# Patient Record
Sex: MALE | Race: WHITE | NOT HISPANIC OR LATINO | URBAN - METROPOLITAN AREA
[De-identification: names, ages, dates, MRNs, and addresses within clinical notes are randomized per-mention and may not be internally consistent; named-entity substitution may affect disease eponyms.]

---

## 2019-01-30 ENCOUNTER — OFFICE (OUTPATIENT)
Dept: URBAN - METROPOLITAN AREA CLINIC 11 | Facility: CLINIC | Age: 59
End: 2019-01-30

## 2019-01-30 VITALS
HEIGHT: 74 IN | HEART RATE: 70 BPM | WEIGHT: 246 LBS | DIASTOLIC BLOOD PRESSURE: 92 MMHG | SYSTOLIC BLOOD PRESSURE: 146 MMHG

## 2019-01-30 DIAGNOSIS — K60.2 ANAL FISSURE, UNSPECIFIED: ICD-10-CM

## 2019-01-30 DIAGNOSIS — K64.9 UNSPECIFIED HEMORRHOIDS: ICD-10-CM

## 2019-01-30 DIAGNOSIS — Z86.010 PERSONAL HISTORY OF COLONIC POLYPS: ICD-10-CM

## 2019-01-30 DIAGNOSIS — K92.1 MELENA: ICD-10-CM

## 2019-01-30 PROCEDURE — 99203 OFFICE O/P NEW LOW 30 MIN: CPT | Performed by: INTERNAL MEDICINE

## 2019-01-30 RX ORDER — POLYETHYLENE GLYCOL 3350, SODIUM SULFATE ANHYDROUS, SODIUM BICARBONATE, SODIUM CHLORIDE, POTASSIUM CHLORIDE 236; 22.74; 6.74; 5.86; 2.97 G/4L; G/4L; G/4L; G/4L; G/4L
POWDER, FOR SOLUTION ORAL
Qty: 4000 | Refills: 0 | Status: COMPLETED
Start: 2019-01-30 | End: 2019-03-04

## 2019-01-30 RX ORDER — NITROGLYCERIN 4 MG/G
0.8 OINTMENT RECTAL
Qty: 1 | Refills: 2 | Status: ACTIVE
Start: 2019-01-30

## 2019-03-04 ENCOUNTER — AMBULATORY SURGICAL CENTER (OUTPATIENT)
Dept: URBAN - METROPOLITAN AREA SURGERY 3 | Facility: SURGERY | Age: 59
End: 2019-03-04

## 2019-03-04 ENCOUNTER — OFFICE (OUTPATIENT)
Dept: URBAN - METROPOLITAN AREA PATHOLOGY 22 | Facility: PATHOLOGY | Age: 59
End: 2019-03-04

## 2019-03-04 VITALS
DIASTOLIC BLOOD PRESSURE: 75 MMHG | HEART RATE: 58 BPM | HEIGHT: 74 IN | HEART RATE: 63 BPM | DIASTOLIC BLOOD PRESSURE: 47 MMHG | DIASTOLIC BLOOD PRESSURE: 75 MMHG | SYSTOLIC BLOOD PRESSURE: 94 MMHG | HEART RATE: 62 BPM | SYSTOLIC BLOOD PRESSURE: 94 MMHG | HEART RATE: 63 BPM | SYSTOLIC BLOOD PRESSURE: 137 MMHG | HEART RATE: 58 BPM | DIASTOLIC BLOOD PRESSURE: 53 MMHG | TEMPERATURE: 97.8 F | RESPIRATION RATE: 16 BRPM | OXYGEN SATURATION: 95 % | DIASTOLIC BLOOD PRESSURE: 47 MMHG | WEIGHT: 230 LBS | DIASTOLIC BLOOD PRESSURE: 53 MMHG | DIASTOLIC BLOOD PRESSURE: 58 MMHG | HEIGHT: 74 IN | OXYGEN SATURATION: 98 % | SYSTOLIC BLOOD PRESSURE: 142 MMHG | RESPIRATION RATE: 16 BRPM | SYSTOLIC BLOOD PRESSURE: 142 MMHG | RESPIRATION RATE: 20 BRPM | SYSTOLIC BLOOD PRESSURE: 115 MMHG | SYSTOLIC BLOOD PRESSURE: 137 MMHG | SYSTOLIC BLOOD PRESSURE: 115 MMHG | HEART RATE: 62 BPM | HEART RATE: 64 BPM | DIASTOLIC BLOOD PRESSURE: 58 MMHG | OXYGEN SATURATION: 96 % | TEMPERATURE: 97.8 F | OXYGEN SATURATION: 95 % | SYSTOLIC BLOOD PRESSURE: 130 MMHG | WEIGHT: 230 LBS | HEART RATE: 64 BPM | RESPIRATION RATE: 20 BRPM | RESPIRATION RATE: 18 BRPM | SYSTOLIC BLOOD PRESSURE: 130 MMHG | OXYGEN SATURATION: 98 % | OXYGEN SATURATION: 96 % | RESPIRATION RATE: 18 BRPM

## 2019-03-04 DIAGNOSIS — K64.4 RESIDUAL HEMORRHOIDAL SKIN TAGS: ICD-10-CM

## 2019-03-04 DIAGNOSIS — K64.0 FIRST DEGREE HEMORRHOIDS: ICD-10-CM

## 2019-03-04 DIAGNOSIS — K57.30 DIVERTICULOSIS OF LARGE INTESTINE WITHOUT PERFORATION OR ABS: ICD-10-CM

## 2019-03-04 DIAGNOSIS — K63.5 POLYP OF COLON: ICD-10-CM

## 2019-03-04 DIAGNOSIS — K62.1 RECTAL POLYP: ICD-10-CM

## 2019-03-04 DIAGNOSIS — D12.3 BENIGN NEOPLASM OF TRANSVERSE COLON: ICD-10-CM

## 2019-03-04 DIAGNOSIS — Z86.010 PERSONAL HISTORY OF COLONIC POLYPS: ICD-10-CM

## 2019-03-04 PROBLEM — D12.5 BENIGN NEOPLASM OF SIGMOID COLON: Status: ACTIVE | Noted: 2019-03-04

## 2019-03-04 PROCEDURE — 45380 COLONOSCOPY AND BIOPSY: CPT | Mod: 33 | Performed by: INTERNAL MEDICINE

## 2019-03-04 PROCEDURE — 88305 TISSUE EXAM BY PATHOLOGIST: CPT | Performed by: INTERNAL MEDICINE

## 2019-05-01 ENCOUNTER — OFFICE (OUTPATIENT)
Dept: URBAN - METROPOLITAN AREA CLINIC 11 | Facility: CLINIC | Age: 59
End: 2019-05-01

## 2019-05-01 VITALS
HEIGHT: 74 IN | DIASTOLIC BLOOD PRESSURE: 80 MMHG | SYSTOLIC BLOOD PRESSURE: 140 MMHG | WEIGHT: 245 LBS | HEART RATE: 76 BPM

## 2019-05-01 DIAGNOSIS — K60.2 ANAL FISSURE, UNSPECIFIED: ICD-10-CM

## 2019-05-01 DIAGNOSIS — K64.0 FIRST DEGREE HEMORRHOIDS: ICD-10-CM

## 2019-05-01 DIAGNOSIS — Z86.010 PERSONAL HISTORY OF COLONIC POLYPS: ICD-10-CM

## 2019-05-01 PROCEDURE — 99213 OFFICE O/P EST LOW 20 MIN: CPT | Performed by: INTERNAL MEDICINE

## 2019-11-06 ENCOUNTER — OFFICE (OUTPATIENT)
Dept: URBAN - METROPOLITAN AREA CLINIC 11 | Facility: CLINIC | Age: 59
End: 2019-11-06

## 2019-11-06 VITALS
DIASTOLIC BLOOD PRESSURE: 91 MMHG | HEIGHT: 74 IN | WEIGHT: 248 LBS | SYSTOLIC BLOOD PRESSURE: 151 MMHG | HEART RATE: 79 BPM

## 2019-11-06 DIAGNOSIS — K60.2 ANAL FISSURE, UNSPECIFIED: ICD-10-CM

## 2019-11-06 DIAGNOSIS — Z86.010 PERSONAL HISTORY OF COLONIC POLYPS: ICD-10-CM

## 2019-11-06 PROCEDURE — 99213 OFFICE O/P EST LOW 20 MIN: CPT | Performed by: INTERNAL MEDICINE

## 2024-01-29 ENCOUNTER — OFFICE (OUTPATIENT)
Dept: URBAN - METROPOLITAN AREA CLINIC 11 | Facility: CLINIC | Age: 64
End: 2024-01-29

## 2024-01-29 VITALS
HEART RATE: 69 BPM | OXYGEN SATURATION: 98 % | SYSTOLIC BLOOD PRESSURE: 169 MMHG | HEIGHT: 74 IN | DIASTOLIC BLOOD PRESSURE: 94 MMHG | WEIGHT: 218 LBS

## 2024-01-29 DIAGNOSIS — K21.9 GASTRO-ESOPHAGEAL REFLUX DISEASE WITHOUT ESOPHAGITIS: ICD-10-CM

## 2024-01-29 DIAGNOSIS — R10.13 EPIGASTRIC PAIN: ICD-10-CM

## 2024-01-29 DIAGNOSIS — K59.00 CONSTIPATION, UNSPECIFIED: ICD-10-CM

## 2024-01-29 DIAGNOSIS — K92.1 MELENA: ICD-10-CM

## 2024-01-29 DIAGNOSIS — Z86.010 PERSONAL HISTORY OF COLONIC POLYPS: ICD-10-CM

## 2024-01-29 DIAGNOSIS — K64.9 UNSPECIFIED HEMORRHOIDS: ICD-10-CM

## 2024-01-29 PROCEDURE — 99204 OFFICE O/P NEW MOD 45 MIN: CPT | Performed by: NURSE PRACTITIONER

## 2024-01-29 NOTE — SERVICENOTES
he is due  for colonoscopy after March 4th.  We will get him scheduled and will get an EGD to further evaluate occasional epigastric pain given his history of GERD.  he will continue his Nexium daily for GERD and will use Gaviscon as needed for epigastric pain.   His hematochezia could likely be secondary to constipation and hemorrhoids.   He is constipation started a year ago when he started using a fiber supplement and admits to not drinking enough water.   Discussed that this could be contributing to his constipation.  He will increase his water intake and use MiraLax as needed.  will see back in 3 months or sooner if needed

## 2024-01-29 NOTE — SERVICEHPINOTES
Mr. Powell is a 63 year old male here today with complaints of blood in stool. He has a history of hemorrhoids and an anal fissure.   he has had blood in his stool, in the toilet and when he wipes sporadically for the last few weeks.  He did have blood with every bowel movement for a solid week.   He started taking a fiber supplement a year ago and has had some occasional constipation during this time.  He admits to not drinking enough water.   He has bowel movements daily but occasionally will go up to 3 days with no bowel movement.   He did have an episode of constipation last week.   He is on Nexium 40 mg daily for GERD but does have some occasional epigastric pain with certain things he eats.   He denies any nausea, vomiting.  br
br
br A detailed history from previous visits with Dr. Stallworth can be seen below. He initially saw me in January of this year when he presented with anal pain for bleeding. His symptoms were consistent with that of an anal fissure. However, he also was due for colorectal cancer screening as he has a history of adenomatous polyps and given the bleeding and his history, we performed a colonoscopy. This was performed in March of this year and on this exam we found 5 polyps. Most of these were hyperplastic polyps in the rectum however 1 in the more proximal colon was adenomatous. He was set for a 5 year recall. I also started him on topical nitroglycerin for his anal fissure.  Since that time he has been doing well.  He had 1 recurrence of some bleeding but this was only 1 instance has otherwise been doing well.

## 2024-03-07 ENCOUNTER — AMBULATORY SURGICAL CENTER (OUTPATIENT)
Dept: URBAN - METROPOLITAN AREA SURGERY 3 | Facility: SURGERY | Age: 64
End: 2024-03-07
Payer: COMMERCIAL

## 2024-03-07 ENCOUNTER — OFFICE (OUTPATIENT)
Dept: URBAN - METROPOLITAN AREA PATHOLOGY 12 | Facility: PATHOLOGY | Age: 64
End: 2024-03-07

## 2024-03-07 ENCOUNTER — AMBULATORY SURGICAL CENTER (OUTPATIENT)
Dept: URBAN - METROPOLITAN AREA SURGERY 3 | Facility: SURGERY | Age: 64
End: 2024-03-07

## 2024-03-07 VITALS
SYSTOLIC BLOOD PRESSURE: 136 MMHG | DIASTOLIC BLOOD PRESSURE: 69 MMHG | OXYGEN SATURATION: 94 % | RESPIRATION RATE: 12 BRPM | DIASTOLIC BLOOD PRESSURE: 69 MMHG | HEART RATE: 58 BPM | WEIGHT: 215 LBS | OXYGEN SATURATION: 93 % | SYSTOLIC BLOOD PRESSURE: 131 MMHG | OXYGEN SATURATION: 93 % | DIASTOLIC BLOOD PRESSURE: 68 MMHG | SYSTOLIC BLOOD PRESSURE: 127 MMHG | HEART RATE: 60 BPM | HEART RATE: 65 BPM | HEIGHT: 74 IN | DIASTOLIC BLOOD PRESSURE: 69 MMHG | SYSTOLIC BLOOD PRESSURE: 154 MMHG | DIASTOLIC BLOOD PRESSURE: 82 MMHG | HEART RATE: 65 BPM | SYSTOLIC BLOOD PRESSURE: 127 MMHG | SYSTOLIC BLOOD PRESSURE: 136 MMHG | DIASTOLIC BLOOD PRESSURE: 82 MMHG | DIASTOLIC BLOOD PRESSURE: 82 MMHG | SYSTOLIC BLOOD PRESSURE: 136 MMHG | DIASTOLIC BLOOD PRESSURE: 68 MMHG | RESPIRATION RATE: 12 BRPM | SYSTOLIC BLOOD PRESSURE: 131 MMHG | HEART RATE: 65 BPM | TEMPERATURE: 98.5 F | SYSTOLIC BLOOD PRESSURE: 131 MMHG | RESPIRATION RATE: 17 BRPM | RESPIRATION RATE: 19 BRPM | HEART RATE: 60 BPM | SYSTOLIC BLOOD PRESSURE: 123 MMHG | WEIGHT: 215 LBS | TEMPERATURE: 97.9 F | WEIGHT: 215 LBS | SYSTOLIC BLOOD PRESSURE: 154 MMHG | TEMPERATURE: 97.9 F | RESPIRATION RATE: 23 BRPM | TEMPERATURE: 98.5 F | HEART RATE: 58 BPM | RESPIRATION RATE: 19 BRPM | RESPIRATION RATE: 23 BRPM | DIASTOLIC BLOOD PRESSURE: 81 MMHG | OXYGEN SATURATION: 96 % | HEIGHT: 74 IN | HEART RATE: 56 BPM | HEART RATE: 58 BPM | OXYGEN SATURATION: 92 % | SYSTOLIC BLOOD PRESSURE: 127 MMHG | OXYGEN SATURATION: 93 % | RESPIRATION RATE: 16 BRPM | HEART RATE: 60 BPM | RESPIRATION RATE: 17 BRPM | SYSTOLIC BLOOD PRESSURE: 123 MMHG | RESPIRATION RATE: 23 BRPM | SYSTOLIC BLOOD PRESSURE: 154 MMHG | DIASTOLIC BLOOD PRESSURE: 81 MMHG | RESPIRATION RATE: 17 BRPM | RESPIRATION RATE: 16 BRPM | RESPIRATION RATE: 16 BRPM | OXYGEN SATURATION: 94 % | OXYGEN SATURATION: 96 % | OXYGEN SATURATION: 96 % | OXYGEN SATURATION: 94 % | TEMPERATURE: 98.5 F | SYSTOLIC BLOOD PRESSURE: 123 MMHG | RESPIRATION RATE: 19 BRPM | HEART RATE: 56 BPM | DIASTOLIC BLOOD PRESSURE: 68 MMHG | DIASTOLIC BLOOD PRESSURE: 81 MMHG | HEIGHT: 74 IN | OXYGEN SATURATION: 92 % | TEMPERATURE: 97.9 F | HEART RATE: 56 BPM | OXYGEN SATURATION: 92 % | RESPIRATION RATE: 12 BRPM

## 2024-03-07 DIAGNOSIS — K57.30 DIVERTICULOSIS OF LARGE INTESTINE WITHOUT PERFORATION OR ABS: ICD-10-CM

## 2024-03-07 DIAGNOSIS — Z86.010 PERSONAL HISTORY OF COLONIC POLYPS: ICD-10-CM

## 2024-03-07 DIAGNOSIS — K22.70 BARRETT'S ESOPHAGUS WITHOUT DYSPLASIA: ICD-10-CM

## 2024-03-07 DIAGNOSIS — K31.89 OTHER DISEASES OF STOMACH AND DUODENUM: ICD-10-CM

## 2024-03-07 DIAGNOSIS — K64.0 FIRST DEGREE HEMORRHOIDS: ICD-10-CM

## 2024-03-07 DIAGNOSIS — K21.9 GASTRO-ESOPHAGEAL REFLUX DISEASE WITHOUT ESOPHAGITIS: ICD-10-CM

## 2024-03-07 DIAGNOSIS — R10.13 EPIGASTRIC PAIN: ICD-10-CM

## 2024-03-07 DIAGNOSIS — K63.5 POLYP OF COLON: ICD-10-CM

## 2024-03-07 DIAGNOSIS — K92.1 MELENA: ICD-10-CM

## 2024-03-07 DIAGNOSIS — Z09 ENCOUNTER FOR FOLLOW-UP EXAMINATION AFTER COMPLETED TREATMEN: ICD-10-CM

## 2024-03-07 PROCEDURE — 88342 IMHCHEM/IMCYTCHM 1ST ANTB: CPT | Performed by: STUDENT IN AN ORGANIZED HEALTH CARE EDUCATION/TRAINING PROGRAM

## 2024-03-07 PROCEDURE — 45380 COLONOSCOPY AND BIOPSY: CPT | Performed by: INTERNAL MEDICINE

## 2024-03-07 PROCEDURE — 88313 SPECIAL STAINS GROUP 2: CPT | Performed by: STUDENT IN AN ORGANIZED HEALTH CARE EDUCATION/TRAINING PROGRAM

## 2024-03-07 PROCEDURE — 43239 EGD BIOPSY SINGLE/MULTIPLE: CPT | Mod: 51 | Performed by: INTERNAL MEDICINE

## 2024-03-07 PROCEDURE — 88305 TISSUE EXAM BY PATHOLOGIST: CPT | Performed by: STUDENT IN AN ORGANIZED HEALTH CARE EDUCATION/TRAINING PROGRAM

## 2024-03-11 LAB
GASTRO ONE PATHOLOGY: PDF REPORT: (no result)

## 2024-04-30 ENCOUNTER — OFFICE (OUTPATIENT)
Dept: URBAN - METROPOLITAN AREA CLINIC 11 | Facility: CLINIC | Age: 64
End: 2024-04-30

## 2024-04-30 VITALS
HEIGHT: 74 IN | TEMPERATURE: 93 F | WEIGHT: 221 LBS | HEART RATE: 76 BPM | DIASTOLIC BLOOD PRESSURE: 96 MMHG | DIASTOLIC BLOOD PRESSURE: 87 MMHG | SYSTOLIC BLOOD PRESSURE: 156 MMHG | SYSTOLIC BLOOD PRESSURE: 161 MMHG

## 2024-04-30 DIAGNOSIS — K22.70 BARRETT'S ESOPHAGUS WITHOUT DYSPLASIA: ICD-10-CM

## 2024-04-30 DIAGNOSIS — K92.1 MELENA: ICD-10-CM

## 2024-04-30 DIAGNOSIS — K59.00 CONSTIPATION, UNSPECIFIED: ICD-10-CM

## 2024-04-30 PROCEDURE — 99213 OFFICE O/P EST LOW 20 MIN: CPT | Performed by: NURSE PRACTITIONER

## 2024-04-30 NOTE — SERVICENOTES
he will continue Nexium for GERD and we will repeat EGD in 2 years for his Prajapati's. he will use Miralax as needed for his occasional  constipation.  Will see back in 1 year or sooner if needed

## 2024-04-30 NOTE — SERVICEHPINOTES
Mr. Powell is a 64 year old male here today for follow up  of blood in stool. He has a history of hemorrhoids and an anal fissure. He initially presented with anal pain and bleeding consistent with an anal fissure. He has a history of adenomatous polyps and given the bleeding and his history, jael had a colonoscopy in 3/2019 and found 5 polyps. Most of these were hyperplastic polyps in the rectum however 1 in the more proximal colon was adenomatous. He was set for a 5 year recall. He was started on topical nitroglycerin for his anal fissure. 
nimco
nimco In follow up on 11/6/19, he has been doing well.  He had 1 recurrence of some bleeding but this was only 1 instance has otherwise been doing well. 
nimco
nimco On 1/29/24, he has had blood in his stool, in the toilet and when he wipes sporadically for the last few weeks.  He did have blood with every bowel movement for a solid week.   He started taking a fiber supplement a year ago and has had some occasional constipation during this time.  He admits to not drinking enough water.   He has bowel movements daily but occasionally will go up to 3 days with no bowel movement.   He did have an episode of constipation last week.   He is on Nexium 40 mg daily for GERD but does have some occasional epigastric pain with certain things he eats.  he had an  EGD and colonoscopy on 03/07/2024.  EGD showed gastritis and mucosa suggestive of Prajapati's esophagus.   Pathology was negative for H pylori but confirmed Prajapati's esophagus.   He had moderate diverticulosis, grade 1 internal / external hemorrhoids and benign polyps on colonoscopy.   
nimco
nimco In follow-up on 04/30/2024,  he is doing well and has no complaints.  His GERD is doing well on Nexium and he has not had any episodes of epigastric pain since his last visit.   He still has occasional bright red blood when he wipes but denies any blood in the toilet or in his stool.  He has been having regular bowel movements and denies any constipation.

## 2025-04-30 ENCOUNTER — OFFICE (OUTPATIENT)
Dept: URBAN - METROPOLITAN AREA CLINIC 11 | Facility: CLINIC | Age: 65
End: 2025-04-30
Payer: COMMERCIAL

## 2025-04-30 VITALS
WEIGHT: 217 LBS | DIASTOLIC BLOOD PRESSURE: 85 MMHG | OXYGEN SATURATION: 99 % | SYSTOLIC BLOOD PRESSURE: 139 MMHG | HEART RATE: 78 BPM | HEIGHT: 74 IN

## 2025-04-30 DIAGNOSIS — K22.70 BARRETT'S ESOPHAGUS WITHOUT DYSPLASIA: ICD-10-CM

## 2025-04-30 DIAGNOSIS — K64.9 UNSPECIFIED HEMORRHOIDS: ICD-10-CM

## 2025-04-30 PROCEDURE — 99213 OFFICE O/P EST LOW 20 MIN: CPT | Performed by: NURSE PRACTITIONER

## 2025-04-30 NOTE — SERVICENOTES
he is doing well today and has no major complaints.  His GERD continues to be well-controlled on OTC esomeprazole.  He will continue his grade 1 hemorrhoids get flared up if he goes 2-3 days with no bowel movement.  Discussed using OTC preparation cream and suppositories as needed.  He will be due for repeat surveillance EGD in March 2026.  Will see back in 1 year or sooner if needed

## 2025-04-30 NOTE — SERVICEHPINOTES
Mr. Powell is a 65 year old male here today for follow up  of blood in stool. He has a history of hemorrhoids and an anal fissure. He initially presented with anal pain and bleeding consistent with an anal fissure. He has a history of adenomatous polyps and given the bleeding and his history, elder had a colonoscopy in 3/2019 and found 5 polyps. Most of these were hyperplastic polyps in the rectum however 1 in the more proximal colon was adenomatous. He was set for a 5 year recall. He was started on topical nitroglycerin for his anal fissure. In follow up on 11/6/19, he has been doing well.  He had 1 recurrence of some bleeding but this was only 1 instance has otherwise been doing well.On 1/29/24, he has had blood in his stool, in the toilet and when he wipes sporadically for the last few weeks.  He did have blood with every bowel movement for a solid week.   He started taking a fiber supplement a year ago and has had some occasional constipation during this time.  He admits to not drinking enough water.   He has bowel movements daily but occasionally will go up to 3 days with no bowel movement.   He did have an episode of constipation last week.   He is on Nexium 40 mg daily for GERD but does have some occasional epigastric pain with certain things he eats.  he had an  EGD and colonoscopy on 03/07/2024.  EGD showed gastritis and mucosa suggestive of Prajapati's esophagus.   Pathology was negative for H pylori but confirmed Prajapati's esophagus.   He had moderate diverticulosis, grade 1 internal / external hemorrhoids and benign polyps on colonoscopy.   In follow-up on 04/30/2024,  he is doing well and has no complaints.  His GERD is doing well on Nexium and he has not had any episodes of epigastric pain since his last visit.   He still has occasional bright red blood when he wipes but denies any blood in the toilet or in his stool.  He has been having regular bowel movements and denies any constipation.  
br
nimco In follow-up on 04/30/2025,  he is doing well and has no major complaints or concerns.  His GERD continues to do well on OTC omeprazole 20 mg daily.  He has bowel movements daily but occasionally goes 2-3 days with no bowel movement.  When this happens, he will have 2-3 bowel movements in a day which will irritate his hemorrhoids.  He has not had any significant bleeding from them over the last several months.   He denies any nausea, vomiting, abdominal pain.